# Patient Record
Sex: MALE | Race: OTHER | Employment: FULL TIME | URBAN - METROPOLITAN AREA
[De-identification: names, ages, dates, MRNs, and addresses within clinical notes are randomized per-mention and may not be internally consistent; named-entity substitution may affect disease eponyms.]

---

## 2018-12-05 ENCOUNTER — HOSPITAL ENCOUNTER (EMERGENCY)
Age: 28
Discharge: HOME | End: 2018-12-05
Attending: EMERGENCY MEDICINE

## 2018-12-05 VITALS
RESPIRATION RATE: 16 BRPM | HEART RATE: 78 BPM | OXYGEN SATURATION: 100 % | DIASTOLIC BLOOD PRESSURE: 84 MMHG | SYSTOLIC BLOOD PRESSURE: 135 MMHG | TEMPERATURE: 98 F

## 2018-12-05 DIAGNOSIS — B35.1 ONYCHOMYCOSIS: Primary | ICD-10-CM

## 2018-12-05 DIAGNOSIS — Z71.1 CONCERN ABOUT STD IN MALE WITHOUT DIAGNOSIS: ICD-10-CM

## 2018-12-05 LAB
-: NORMAL
AMORPHOUS: NORMAL
BACTERIA: NORMAL
BILIRUBIN URINE: NEGATIVE
C. TRACHOMATIS DNA ,URINE: NEGATIVE
CASTS UA: NORMAL /LPF (ref 0–8)
COLOR: YELLOW
CRYSTALS, UA: NORMAL /HPF
EPITHELIAL CELLS UA: NORMAL /HPF (ref 0–5)
GLUCOSE URINE: NEGATIVE
KETONES, URINE: NEGATIVE
LEUKOCYTE ESTERASE, URINE: NEGATIVE
MUCUS: NORMAL
N. GONORRHOEAE DNA, URINE: NEGATIVE
NITRITE, URINE: NEGATIVE
OTHER OBSERVATIONS UA: NORMAL
PH UA: 6.5 (ref 5–8)
PROTEIN UA: NEGATIVE
RBC UA: NORMAL /HPF (ref 0–4)
RENAL EPITHELIAL, UA: NORMAL /HPF
SPECIFIC GRAVITY UA: 1.02 (ref 1–1.03)
TRICHOMONAS: NORMAL
TURBIDITY: CLEAR
URINE HGB: NEGATIVE
UROBILINOGEN, URINE: NORMAL
WBC UA: NORMAL /HPF (ref 0–5)
YEAST: NORMAL

## 2018-12-05 PROCEDURE — 87491 CHLMYD TRACH DNA AMP PROBE: CPT

## 2018-12-05 PROCEDURE — 81001 URINALYSIS AUTO W/SCOPE: CPT

## 2018-12-05 PROCEDURE — 99283 EMERGENCY DEPT VISIT LOW MDM: CPT

## 2018-12-05 PROCEDURE — 6360000002 HC RX W HCPCS: Performed by: PHYSICIAN ASSISTANT

## 2018-12-05 PROCEDURE — 6370000000 HC RX 637 (ALT 250 FOR IP): Performed by: PHYSICIAN ASSISTANT

## 2018-12-05 PROCEDURE — 96372 THER/PROPH/DIAG INJ SC/IM: CPT

## 2018-12-05 PROCEDURE — 87591 N.GONORRHOEAE DNA AMP PROB: CPT

## 2018-12-05 RX ORDER — CEFTRIAXONE SODIUM 250 MG/1
250 INJECTION, POWDER, FOR SOLUTION INTRAMUSCULAR; INTRAVENOUS ONCE
Status: COMPLETED | OUTPATIENT
Start: 2018-12-05 | End: 2018-12-05

## 2018-12-05 RX ORDER — AZITHROMYCIN 250 MG/1
1000 TABLET, FILM COATED ORAL ONCE
Status: COMPLETED | OUTPATIENT
Start: 2018-12-05 | End: 2018-12-05

## 2018-12-05 RX ORDER — CLOTRIMAZOLE 1 %
CREAM (GRAM) TOPICAL
Qty: 1 TUBE | Refills: 1 | Status: SHIPPED | OUTPATIENT
Start: 2018-12-05 | End: 2018-12-12

## 2018-12-05 RX ADMIN — AZITHROMYCIN 1000 MG: 250 TABLET, FILM COATED ORAL at 08:22

## 2018-12-05 RX ADMIN — CEFTRIAXONE SODIUM 250 MG: 1 INJECTION, POWDER, FOR SOLUTION INTRAMUSCULAR; INTRAVENOUS at 08:23

## 2018-12-05 ASSESSMENT — ENCOUNTER SYMPTOMS
ABDOMINAL PAIN: 0
SORE THROAT: 0
DIARRHEA: 0
BACK PAIN: 0
SHORTNESS OF BREATH: 0
VOMITING: 0
COLOR CHANGE: 0
COUGH: 0
NAUSEA: 0

## 2018-12-10 ENCOUNTER — OFFICE VISIT (OUTPATIENT)
Dept: PRIMARY CARE CLINIC | Age: 28
End: 2018-12-10

## 2018-12-10 VITALS
TEMPERATURE: 97.7 F | WEIGHT: 146.6 LBS | BODY MASS INDEX: 25.98 KG/M2 | SYSTOLIC BLOOD PRESSURE: 134 MMHG | HEART RATE: 61 BPM | HEIGHT: 63 IN | DIASTOLIC BLOOD PRESSURE: 82 MMHG | OXYGEN SATURATION: 98 %

## 2018-12-10 DIAGNOSIS — B85.3: Primary | ICD-10-CM

## 2018-12-10 PROCEDURE — 99202 OFFICE O/P NEW SF 15 MIN: CPT | Performed by: INTERNAL MEDICINE

## 2018-12-10 ASSESSMENT — PATIENT HEALTH QUESTIONNAIRE - PHQ9
SUM OF ALL RESPONSES TO PHQ QUESTIONS 1-9: 0
1. LITTLE INTEREST OR PLEASURE IN DOING THINGS: 0
SUM OF ALL RESPONSES TO PHQ9 QUESTIONS 1 & 2: 0
SUM OF ALL RESPONSES TO PHQ QUESTIONS 1-9: 0
2. FEELING DOWN, DEPRESSED OR HOPELESS: 0

## 2018-12-10 NOTE — PROGRESS NOTES
[facility]an [facility]  [place] [address]  Dept: [phone]  Dept Fax: [phone]    Saba Gale is a 34 y.o. male who presents today for   Chief Complaint   Patient presents with   [facility] Other     concerned about STD with burning and itching    and follow upof chronic medical problems: There is no problem list on file for this patient. .    No past medical history on file. No past surgical history on file. No family history on file. Social History   Substance Use Topics    Smoking status: Never Smoker    Smokeless tobacco: Never Used    Alcohol use Yes      Current Outpatient Prescriptions   Medication Sig Dispense Refill    pyrethrins-piperonyl butoxide (RID LICE KILLING SHAMPOO) 0.33-4 % shampoo Apply topically once. Repeat in one week. 1 Bottle 1    clotrimazole (LOTRIMIN) 1 % cream Apply topically 3 times daily to lesions on skin. 1 Tube 1     No current facility-administered medications for this visit. No Known Allergies    Health Maintenance   Topic Date Due    HIV screen  09/23/2007    DTaP/Tdap/Td vaccine (1 - Tdap) 09/23/2011    Flu vaccine (1) 12/31/2018 (Originally 9/1/2018)       Controlled Substances Monitoring:      HPI:     Sexually Transmitted Diseases   The patient's primary symptoms include genital itching. The patient's pertinent negatives include no penile discharge. This is a new problem. The current episode started in the past 7 days. The problem occurs constantly. The problem has been unchanged. The patient is experiencing no pain. Nothing aggravates the symptoms. He has tried antibiotics (seen in the ED treated for STD with IM medication now with genital rash and itching) for the symptoms. The treatment provided mild relief. He is sexually active. It is unknown whether or not his partner has an STD. ROS:     Review of Systems   Genitourinary: Negative for discharge.    All other systems

## 2018-12-18 ENCOUNTER — OFFICE VISIT (OUTPATIENT)
Dept: FAMILY MEDICINE CLINIC | Age: 28
End: 2018-12-18

## 2018-12-18 VITALS
WEIGHT: 132.2 LBS | HEART RATE: 70 BPM | BODY MASS INDEX: 23.42 KG/M2 | HEIGHT: 63 IN | TEMPERATURE: 99.4 F | SYSTOLIC BLOOD PRESSURE: 129 MMHG | DIASTOLIC BLOOD PRESSURE: 78 MMHG

## 2018-12-18 DIAGNOSIS — R21 RASH OF GENITAL AREA: Primary | ICD-10-CM

## 2018-12-18 PROCEDURE — 99203 OFFICE O/P NEW LOW 30 MIN: CPT | Performed by: STUDENT IN AN ORGANIZED HEALTH CARE EDUCATION/TRAINING PROGRAM

## 2018-12-18 PROCEDURE — 99201 HC NEW PT, E/M LEVEL 1: CPT | Performed by: STUDENT IN AN ORGANIZED HEALTH CARE EDUCATION/TRAINING PROGRAM

## 2018-12-18 RX ORDER — NYSTATIN 100000 [USP'U]/G
POWDER TOPICAL
Qty: 1 BOTTLE | Refills: 1 | Status: SHIPPED | OUTPATIENT
Start: 2018-12-18 | End: 2019-01-09 | Stop reason: SDUPTHER

## 2018-12-18 RX ORDER — IVERMECTIN 3 MG/1
200 TABLET ORAL
Qty: 4 TABLET | Refills: 1 | Status: SHIPPED | OUTPATIENT
Start: 2018-12-18 | End: 2019-01-01

## 2018-12-18 ASSESSMENT — ENCOUNTER SYMPTOMS
NAUSEA: 0
CONSTIPATION: 0
DIARRHEA: 0
VOMITING: 0
ABDOMINAL PAIN: 0
COLOR CHANGE: 0

## 2018-12-18 NOTE — PROGRESS NOTES
Subjective:    Saba Gale is a 34 y.o. male with  has no past medical history on file. No family history on file. Presented to the office today for:  Chief Complaint   Patient presents with    Follow-Up from Hospital     here for hospital follow up rash on anus and spreading    Establish Care     here to establish care       Mr. Pam Contreras a 33 yo  male presents to office for itching and burning of the ano-genital region. Has no reported medical history. Mr. Pam Contreras brought an  that he preferred to use over ours. Ano-genital Rash:  Present for just over 1 week, was seen at a walk-in clinic 12/10 and given Permethrin shampoo, used this once and now has itching and burning  It gets worse at work where he states he gets sweaty   Was seen in the ED 2 weeks ago for possible STI, given prophylactic ceftriaxone, NAAT negative and U&A WNL  Denies fever, chills, or urinary complaints   Describes the discomfort as intensely itchy and burning   Last sexual activity 1 week ago  Shaves regularly in the genital region; no change in lotion, shampoo, or creams for hygiene     Had fungal infection of finger nail on MR, no current complaints   No other complaints noted   Briefly discussed healthy diet  Will discusses vaccination and possible diabetes testing at next visit           Review of Systems   Constitutional: Negative for activity change, appetite change, chills, fatigue and fever. Gastrointestinal: Negative for abdominal pain, constipation, diarrhea, nausea and vomiting. Genitourinary: Negative for difficulty urinating, discharge, flank pain, frequency, genital sores, penile pain, testicular pain and urgency. Skin: Positive for rash. Negative for color change, pallor and wound. Allergic/Immunologic: Negative for immunocompromised state.        Objective:    /78 (Site: Left Upper Arm, Position: Sitting, Cuff Size: Medium Adult) Comment: machine  Pulse 70   Temp 99.4 °F (37.4 °C)

## 2018-12-19 NOTE — PROGRESS NOTES
I have reviewed and discussed pittman elements of Christopher Virgen with the resident including plan of care and follow up and agree with the care asif plan.

## 2018-12-21 ENCOUNTER — HOSPITAL ENCOUNTER (OUTPATIENT)
Age: 28
Setting detail: SPECIMEN
Discharge: HOME | End: 2018-12-21

## 2018-12-21 ENCOUNTER — OFFICE VISIT (OUTPATIENT)
Dept: FAMILY MEDICINE CLINIC | Age: 28
End: 2018-12-21

## 2018-12-21 VITALS
DIASTOLIC BLOOD PRESSURE: 78 MMHG | TEMPERATURE: 98.2 F | HEIGHT: 63 IN | BODY MASS INDEX: 23.39 KG/M2 | WEIGHT: 132 LBS | SYSTOLIC BLOOD PRESSURE: 133 MMHG | HEART RATE: 71 BPM

## 2018-12-21 DIAGNOSIS — N45.1 EPIDIDYMITIS: ICD-10-CM

## 2018-12-21 DIAGNOSIS — N45.1 EPIDIDYMITIS: Primary | ICD-10-CM

## 2018-12-21 LAB
BILIRUBIN URINE: NEGATIVE
BILIRUBIN, POC: NEGATIVE
BLOOD URINE, POC: NEGATIVE
CLARITY, POC: CLEAR
COLOR, POC: YELLOW
COLOR: YELLOW
COMMENT UA: NORMAL
GLUCOSE URINE, POC: NEGATIVE
GLUCOSE URINE: NEGATIVE
KETONES, POC: NEGATIVE
KETONES, URINE: NEGATIVE
LEUKOCYTE EST, POC: NEGATIVE
LEUKOCYTE ESTERASE, URINE: NEGATIVE
NITRITE, POC: NEGATIVE
NITRITE, URINE: NEGATIVE
PH UA: 6.5 (ref 5–8)
PH, POC: 6.5
PROTEIN UA: NEGATIVE
PROTEIN, POC: 30
SPECIFIC GRAVITY UA: 1.03 (ref 1–1.03)
SPECIFIC GRAVITY, POC: 1.02
TURBIDITY: CLEAR
URINE HGB: NEGATIVE
UROBILINOGEN, POC: NEGATIVE
UROBILINOGEN, URINE: NORMAL

## 2018-12-21 PROCEDURE — 99211 OFF/OP EST MAY X REQ PHY/QHP: CPT | Performed by: STUDENT IN AN ORGANIZED HEALTH CARE EDUCATION/TRAINING PROGRAM

## 2018-12-21 PROCEDURE — 81003 URINALYSIS AUTO W/O SCOPE: CPT | Performed by: STUDENT IN AN ORGANIZED HEALTH CARE EDUCATION/TRAINING PROGRAM

## 2018-12-21 PROCEDURE — 99213 OFFICE O/P EST LOW 20 MIN: CPT | Performed by: STUDENT IN AN ORGANIZED HEALTH CARE EDUCATION/TRAINING PROGRAM

## 2018-12-21 RX ORDER — DOXYCYCLINE HYCLATE 100 MG/1
100 CAPSULE ORAL 2 TIMES DAILY
Qty: 14 CAPSULE | Refills: 0 | Status: SHIPPED | OUTPATIENT
Start: 2018-12-21 | End: 2018-12-28

## 2018-12-21 NOTE — PROGRESS NOTES
well-developed and well-nourished. No distress. Genitourinary: Testes normal and penis normal. Right testis shows no swelling. Left testis shows no swelling. Uncircumcised. No phimosis, paraphimosis, hypospadias, penile erythema or penile tenderness. No discharge found. Musculoskeletal: Normal range of motion. Neurological: He is alert and oriented to person, place, and time. Skin: Skin is warm and dry. He is not diaphoretic. No results found for: WBC, HGB, HCT, PLT, CHOL, TRIG, HDL, LDLDIRECT, ALT, AST, NA, K, CL, CREATININE, BUN, CO2, TSH, PSA, INR, GLUF, LABA1C, LABMICR  No results found for: CALCIUM, PHOS  No results found for: LDLCALC, LDLCHOLESTEROL, LDLDIRECT    Assessment and Plan:    1. Epididymitis  - Chlamydia/GC DNA, Urine  - POCT Urinalysis with reflex to culture  - doxycycline hyclate (VIBRAMYCIN) 100 MG capsule; Take 1 capsule by mouth 2 times daily for 7 days  Dispense: 14 capsule; Refill: 0  -Patient instructed to discontinue Ivermectin  -Patient to follow-up in clinic if symptoms worsen or fail to improve.  -Patient educated on safe sex practices. Requested Prescriptions     Signed Prescriptions Disp Refills    doxycycline hyclate (VIBRAMYCIN) 100 MG capsule 14 capsule 0     Sig: Take 1 capsule by mouth 2 times daily for 7 days       There are no discontinued medications. Enoe received counseling on the following healthy behaviors:nutrition, exercise and medication adherence    Discussed use, benefit, and side effects of prescribed medications. Barriers to medication compliance addressed. All patient questions answered. Pt voiced understanding. Return if symptoms worsen or fail to improve.

## 2018-12-24 LAB
C. TRACHOMATIS DNA ,URINE: NEGATIVE
N. GONORRHOEAE DNA, URINE: NEGATIVE

## 2019-01-08 ENCOUNTER — OFFICE VISIT (OUTPATIENT)
Dept: FAMILY MEDICINE CLINIC | Age: 29
End: 2019-01-08

## 2019-01-08 VITALS
TEMPERATURE: 97.8 F | WEIGHT: 132.6 LBS | HEIGHT: 63 IN | SYSTOLIC BLOOD PRESSURE: 137 MMHG | BODY MASS INDEX: 23.5 KG/M2 | HEART RATE: 82 BPM | DIASTOLIC BLOOD PRESSURE: 80 MMHG

## 2019-01-08 DIAGNOSIS — N48.89 PENILE PAIN: Primary | ICD-10-CM

## 2019-01-08 DIAGNOSIS — L29.0 ANAL PRURITUS: ICD-10-CM

## 2019-01-08 PROCEDURE — 99213 OFFICE O/P EST LOW 20 MIN: CPT | Performed by: STUDENT IN AN ORGANIZED HEALTH CARE EDUCATION/TRAINING PROGRAM

## 2019-01-08 RX ORDER — DIAPER,BRIEF,INFANT-TODD,DISP
EACH MISCELLANEOUS
Qty: 1 TUBE | Refills: 1 | Status: SHIPPED | OUTPATIENT
Start: 2019-01-08 | End: 2019-01-15

## 2019-01-08 ASSESSMENT — ENCOUNTER SYMPTOMS
ANAL BLEEDING: 0
COUGH: 0
VOMITING: 0
DIARRHEA: 0
SHORTNESS OF BREATH: 0
CONSTIPATION: 0
RECTAL PAIN: 0
ABDOMINAL DISTENTION: 0
NAUSEA: 0
ABDOMINAL PAIN: 0

## 2019-01-09 ENCOUNTER — TELEPHONE (OUTPATIENT)
Dept: FAMILY MEDICINE CLINIC | Age: 29
End: 2019-01-09

## 2019-01-09 DIAGNOSIS — L29.0 ANAL PRURITUS: Primary | ICD-10-CM

## 2019-01-09 RX ORDER — NYSTATIN 100000 [USP'U]/G
POWDER TOPICAL
Qty: 1 BOTTLE | Refills: 1 | Status: SHIPPED | OUTPATIENT
Start: 2019-01-09 | End: 2019-03-01 | Stop reason: ALTCHOICE

## 2019-01-25 ENCOUNTER — OFFICE VISIT (OUTPATIENT)
Dept: UROLOGY | Age: 29
End: 2019-01-25

## 2019-01-25 VITALS
HEIGHT: 61 IN | WEIGHT: 134.4 LBS | BODY MASS INDEX: 25.37 KG/M2 | SYSTOLIC BLOOD PRESSURE: 127 MMHG | HEART RATE: 71 BPM | DIASTOLIC BLOOD PRESSURE: 81 MMHG

## 2019-01-25 DIAGNOSIS — N48.1 BALANITIS: ICD-10-CM

## 2019-01-25 DIAGNOSIS — N48.89 PENILE PAIN: Primary | ICD-10-CM

## 2019-01-25 LAB
APPEARANCE FLUID: ABNORMAL
BILIRUBIN, POC: ABNORMAL
BLOOD URINE, POC: ABNORMAL
CLARITY, POC: CLEAR
COLOR, POC: YELLOW
GLUCOSE URINE, POC: ABNORMAL
KETONES, POC: ABNORMAL
LEUKOCYTE EST, POC: ABNORMAL
NITRITE, POC: ABNORMAL
PH, POC: 7
PROTEIN, POC: ABNORMAL
SPECIFIC GRAVITY, POC: 1.02
UROBILINOGEN, POC: 0.2

## 2019-01-25 PROCEDURE — 99204 OFFICE O/P NEW MOD 45 MIN: CPT | Performed by: UROLOGY

## 2019-01-25 PROCEDURE — 81002 URINALYSIS NONAUTO W/O SCOPE: CPT | Performed by: UROLOGY

## 2019-01-25 RX ORDER — CLOTRIMAZOLE 1 %
CREAM (GRAM) TOPICAL
Qty: 1 TUBE | Refills: 0 | Status: SHIPPED | OUTPATIENT
Start: 2019-01-25 | End: 2019-02-01

## 2019-01-25 RX ADMIN — Medication: at 10:35

## 2019-02-11 ENCOUNTER — OFFICE VISIT (OUTPATIENT)
Dept: FAMILY MEDICINE CLINIC | Age: 29
End: 2019-02-11

## 2019-02-11 VITALS
HEIGHT: 61 IN | DIASTOLIC BLOOD PRESSURE: 85 MMHG | BODY MASS INDEX: 25.22 KG/M2 | WEIGHT: 133.6 LBS | TEMPERATURE: 98.3 F | HEART RATE: 68 BPM | SYSTOLIC BLOOD PRESSURE: 136 MMHG

## 2019-02-11 DIAGNOSIS — L29.0 ANAL PRURITUS: ICD-10-CM

## 2019-02-11 DIAGNOSIS — K21.9 GASTROESOPHAGEAL REFLUX DISEASE, ESOPHAGITIS PRESENCE NOT SPECIFIED: Primary | ICD-10-CM

## 2019-02-11 PROCEDURE — 99213 OFFICE O/P EST LOW 20 MIN: CPT | Performed by: STUDENT IN AN ORGANIZED HEALTH CARE EDUCATION/TRAINING PROGRAM

## 2019-02-11 PROCEDURE — 99211 OFF/OP EST MAY X REQ PHY/QHP: CPT | Performed by: STUDENT IN AN ORGANIZED HEALTH CARE EDUCATION/TRAINING PROGRAM

## 2019-02-11 RX ORDER — RANITIDINE 150 MG/1
150 TABLET ORAL 2 TIMES DAILY
Qty: 60 TABLET | Refills: 0 | Status: SHIPPED | OUTPATIENT
Start: 2019-02-11 | End: 2021-02-22

## 2019-02-11 ASSESSMENT — ENCOUNTER SYMPTOMS
DIARRHEA: 0
ANAL BLEEDING: 0
NAUSEA: 1
RHINORRHEA: 0
CONSTIPATION: 0
COLOR CHANGE: 0
SHORTNESS OF BREATH: 0
CHOKING: 0
ABDOMINAL PAIN: 0
WHEEZING: 0

## 2019-02-11 ASSESSMENT — PATIENT HEALTH QUESTIONNAIRE - PHQ9
2. FEELING DOWN, DEPRESSED OR HOPELESS: 1
SUM OF ALL RESPONSES TO PHQ9 QUESTIONS 1 & 2: 1
1. LITTLE INTEREST OR PLEASURE IN DOING THINGS: 0
SUM OF ALL RESPONSES TO PHQ QUESTIONS 1-9: 1
SUM OF ALL RESPONSES TO PHQ QUESTIONS 1-9: 1

## 2019-02-21 ENCOUNTER — HOSPITAL ENCOUNTER (OUTPATIENT)
Age: 29
Setting detail: SPECIMEN
Discharge: HOME | End: 2019-02-21

## 2019-02-21 ENCOUNTER — OFFICE VISIT (OUTPATIENT)
Dept: FAMILY MEDICINE CLINIC | Age: 29
End: 2019-02-21

## 2019-02-21 ENCOUNTER — HOSPITAL ENCOUNTER (OUTPATIENT)
Dept: ULTRASOUND IMAGING | Age: 29
Discharge: HOME | End: 2019-02-23

## 2019-02-21 ENCOUNTER — TELEPHONE (OUTPATIENT)
Dept: FAMILY MEDICINE CLINIC | Age: 29
End: 2019-02-21

## 2019-02-21 VITALS
HEIGHT: 60 IN | SYSTOLIC BLOOD PRESSURE: 118 MMHG | HEART RATE: 81 BPM | TEMPERATURE: 98 F | WEIGHT: 133.8 LBS | BODY MASS INDEX: 26.27 KG/M2 | DIASTOLIC BLOOD PRESSURE: 78 MMHG

## 2019-02-21 DIAGNOSIS — N50.811 TESTICULAR PAIN, RIGHT: Primary | ICD-10-CM

## 2019-02-21 DIAGNOSIS — R52 PAIN: ICD-10-CM

## 2019-02-21 DIAGNOSIS — N50.811 TESTICULAR PAIN, RIGHT: ICD-10-CM

## 2019-02-21 PROCEDURE — 93976 VASCULAR STUDY: CPT

## 2019-02-21 PROCEDURE — 99213 OFFICE O/P EST LOW 20 MIN: CPT | Performed by: STUDENT IN AN ORGANIZED HEALTH CARE EDUCATION/TRAINING PROGRAM

## 2019-02-21 PROCEDURE — 96372 THER/PROPH/DIAG INJ SC/IM: CPT

## 2019-02-21 PROCEDURE — 76870 US EXAM SCROTUM: CPT

## 2019-02-21 PROCEDURE — 99211 OFF/OP EST MAY X REQ PHY/QHP: CPT | Performed by: FAMILY MEDICINE

## 2019-02-21 PROCEDURE — 6360000002 HC RX W HCPCS

## 2019-02-21 RX ORDER — CEFTRIAXONE SODIUM 250 MG/1
250 INJECTION, POWDER, FOR SOLUTION INTRAMUSCULAR; INTRAVENOUS ONCE
Status: COMPLETED | OUTPATIENT
Start: 2019-02-21 | End: 2019-02-21

## 2019-02-21 RX ORDER — AZITHROMYCIN 1 G
1 PACKET (EA) ORAL ONCE
Qty: 1 PACKAGE | Refills: 0 | Status: SHIPPED | OUTPATIENT
Start: 2019-02-21 | End: 2019-02-21

## 2019-02-21 RX ADMIN — CEFTRIAXONE SODIUM 250 MG: 250 INJECTION, POWDER, FOR SOLUTION INTRAMUSCULAR; INTRAVENOUS at 09:58

## 2019-02-21 ASSESSMENT — ENCOUNTER SYMPTOMS
NAUSEA: 0
VOMITING: 0

## 2019-02-22 LAB
C. TRACHOMATIS DNA ,URINE: NEGATIVE
N. GONORRHOEAE DNA, URINE: NEGATIVE
SPECIMEN DESCRIPTION: NORMAL

## 2019-02-23 ENCOUNTER — TELEPHONE (OUTPATIENT)
Dept: FAMILY MEDICINE CLINIC | Age: 29
End: 2019-02-23

## 2019-02-23 DIAGNOSIS — N39.0 URINARY TRACT INFECTION WITHOUT HEMATURIA, SITE UNSPECIFIED: Primary | ICD-10-CM

## 2019-02-23 LAB
CULTURE: ABNORMAL
Lab: ABNORMAL
SPECIMEN DESCRIPTION: ABNORMAL

## 2019-02-23 RX ORDER — CIPROFLOXACIN 500 MG/1
500 TABLET, FILM COATED ORAL 2 TIMES DAILY
Qty: 14 TABLET | Refills: 0 | Status: SHIPPED | OUTPATIENT
Start: 2019-02-23 | End: 2019-03-01 | Stop reason: ALTCHOICE

## 2019-03-01 ENCOUNTER — OFFICE VISIT (OUTPATIENT)
Dept: UROLOGY | Age: 29
End: 2019-03-01

## 2019-03-01 VITALS
HEIGHT: 60 IN | HEART RATE: 80 BPM | BODY MASS INDEX: 26.35 KG/M2 | RESPIRATION RATE: 16 BRPM | SYSTOLIC BLOOD PRESSURE: 134 MMHG | WEIGHT: 134.2 LBS | DIASTOLIC BLOOD PRESSURE: 91 MMHG

## 2019-03-01 DIAGNOSIS — N45.1 EPIDIDYMITIS: Primary | ICD-10-CM

## 2019-03-01 PROCEDURE — 99213 OFFICE O/P EST LOW 20 MIN: CPT | Performed by: UROLOGY

## 2019-03-01 RX ORDER — DOXYCYCLINE HYCLATE 100 MG
100 TABLET ORAL 2 TIMES DAILY
Qty: 14 TABLET | Refills: 0 | Status: SHIPPED | OUTPATIENT
Start: 2019-03-01 | End: 2019-03-08

## 2020-09-09 ENCOUNTER — NURSE TRIAGE (OUTPATIENT)
Dept: OTHER | Facility: CLINIC | Age: 30
End: 2020-09-09

## 2020-09-09 NOTE — TELEPHONE ENCOUNTER
Received call from Zac Membreno in [facility]. Xiomara Marr, is patient's  and patient is on the line. He is having burning with urination about a month ago. Pain is 5-6/10. Denies known covid 19 exposure. Call soft transferred to [facility] in [address] to schedule appointment. Please do not reply to the triage nurse through this encounter. Any subsequent communication should be directly with the patient.      Reason for Disposition   All other males with painful urination, or patient wants to be seen    Protocols used: URINATION PAIN - MALE-ADULT-OH

## 2020-09-11 ENCOUNTER — TELEPHONE (OUTPATIENT)
Dept: FAMILY MEDICINE CLINIC | Age: 30
End: 2020-09-11

## 2020-09-21 ENCOUNTER — OFFICE VISIT (OUTPATIENT)
Dept: FAMILY MEDICINE CLINIC | Age: 30
End: 2020-09-21

## 2020-09-21 ENCOUNTER — HOSPITAL ENCOUNTER (OUTPATIENT)
Age: 30
Setting detail: SPECIMEN
Discharge: HOME | End: 2020-09-21

## 2020-09-21 VITALS
SYSTOLIC BLOOD PRESSURE: 126 MMHG | WEIGHT: 141 LBS | TEMPERATURE: 96.6 F | DIASTOLIC BLOOD PRESSURE: 78 MMHG | BODY MASS INDEX: 27.68 KG/M2 | HEART RATE: 61 BPM | HEIGHT: 60 IN

## 2020-09-21 LAB
BILIRUBIN URINE: NEGATIVE
COLOR: YELLOW
COMMENT UA: NORMAL
GLUCOSE URINE: NEGATIVE
KETONES, URINE: NEGATIVE
LEUKOCYTE ESTERASE, URINE: NEGATIVE
NITRITE, URINE: NEGATIVE
PH UA: 7 (ref 5–8)
PROTEIN UA: NEGATIVE
SPECIFIC GRAVITY UA: 1.01 (ref 1–1.03)
TURBIDITY: CLEAR
URINE HGB: NEGATIVE
UROBILINOGEN, URINE: NORMAL

## 2020-09-21 PROCEDURE — 99213 OFFICE O/P EST LOW 20 MIN: CPT | Performed by: STUDENT IN AN ORGANIZED HEALTH CARE EDUCATION/TRAINING PROGRAM

## 2020-09-21 PROCEDURE — 99211 OFF/OP EST MAY X REQ PHY/QHP: CPT | Performed by: STUDENT IN AN ORGANIZED HEALTH CARE EDUCATION/TRAINING PROGRAM

## 2020-09-21 ASSESSMENT — ENCOUNTER SYMPTOMS
SHORTNESS OF BREATH: 0
CHEST TIGHTNESS: 0
CONSTIPATION: 0
VOMITING: 0
DIARRHEA: 0
NAUSEA: 0
ABDOMINAL PAIN: 0

## 2020-09-21 ASSESSMENT — PATIENT HEALTH QUESTIONNAIRE - PHQ9
1. LITTLE INTEREST OR PLEASURE IN DOING THINGS: 3
SUM OF ALL RESPONSES TO PHQ9 QUESTIONS 1 & 2: 6
2. FEELING DOWN, DEPRESSED OR HOPELESS: 3
SUM OF ALL RESPONSES TO PHQ QUESTIONS 1-9: 6
SUM OF ALL RESPONSES TO PHQ QUESTIONS 1-9: 6
DEPRESSION UNABLE TO ASSESS: FUNCTIONAL CAPACITY MOTIVATION LIMITS ACCURACY

## 2020-09-21 NOTE — PATIENT INSTRUCTIONS
Thank you for letting us take care of you today. We hope all your questions were addressed. If a question was overlooked or something else comes to mind after you return home, please contact a member of your Care Team listed below. Your Care Team at [place] [address] is Team #5  Abimael Broderick MD (Faculty)  Kenyatta Cabrera MD (Resident)  Shan Solis MD (Resident)  Oscar Muñiz MD (Resident)  FABIANO Solis. ,JENI SETHI, JENI Garces ([address] office)  [facility] office)  Hadley Mercado, [address] (Clinical Practice Manager)  Nolan Church [facility] (Clinical Pharmacist)       Office phone number: [phone]    If you need to get in right away due to illness, please be advised we have \"Same Day\" appointments available Monday-Friday. Please call us at [phone] option #3 to schedule your \"Same Day\" appointment.

## 2020-09-21 NOTE — PROGRESS NOTES
Subjective:    Melissa Arango is a 34 y.o. male with  has no past medical history on file. Presented to the office today for:  Chief Complaint   Patient presents with    Urinary Tract Infection     abd pain, burns, after unrination still urinated     Constipation     abd pain, making sounds     Health Maintenance     declined tdap vaccine/positive PHQ-9       HPI    34year old [address] speaking male presented with complaint of burning on urination. History obtained through phone . Patient stated that he has been having burning on urination for the past 1 month. Patient also reported incomplete emptying of the bladder. Patient denied any fever, chills, nausea, vomiting. Patient denied any urethral discharge. Patient also complained of mild generalized abdominal pain that self-resolves. Patient was seen by urologist in the past and treated by epididymitis because of symptoms of testicular pain. Has not seen urologist since. Denied any testicular pain today. No other acute concerns at this time. Review of Systems   Constitutional: Negative for chills and fever. Eyes: Negative for visual disturbance. Respiratory: Negative for chest tightness and shortness of breath. Cardiovascular: Negative for chest pain and leg swelling. Gastrointestinal: Negative for abdominal pain, constipation, diarrhea, nausea and vomiting. Genitourinary: Positive for dysuria. Negative for discharge, penile pain and testicular pain. Neurological: Negative for headaches. The patient has a No family history on file. Objective:    /78 (Site: Left Upper Arm, Position: Sitting, Cuff Size: Medium Adult) Comment: machine  Pulse 61   Temp 96.6 °F (35.9 °C) (Temporal)   Ht 4' 11\" (1.499 m)   Wt 141 lb (64 kg)   BMI 28.48 kg/m²    BP Readings from Last 3 Encounters:   09/21/20 126/78   03/01/19 (!) 134/91   02/21/19 118/78       Physical Exam  Vitals signs reviewed.    Constitutional:       Appearance: He is well-developed. HENT:      Head: Normocephalic and atraumatic. Eyes:      Pupils: Pupils are equal, round, and reactive to light. Cardiovascular:      Rate and Rhythm: Normal rate and regular rhythm. Heart sounds: Normal heart sounds. Pulmonary:      Effort: Pulmonary effort is normal. No respiratory distress. Breath sounds: Normal breath sounds. No wheezing or rales. Abdominal:      General: Bowel sounds are normal.      Palpations: Abdomen is soft. Tenderness: There is no abdominal tenderness. Skin:     General: Skin is warm and dry. Neurological:      Mental Status: He is alert and oriented to person, place, and time. No results found for: WBC, HGB, HCT, PLT, CHOL, TRIG, HDL, LDLDIRECT, ALT, AST, NA, K, CL, CREATININE, BUN, CO2, TSH, PSA, INR, GLUF, LABA1C, LABMICR  No results found for: CALCIUM, PHOS  No results found for: LDLCALC, LDLCHOLESTEROL, LDLDIRECT    Assessment and Plan:    1. Burning with urination  - will follow up in 1 week, will consider US if symptoms still persist  - Urinalysis; Future  - Culture, Urine; Future  - Chlamydia/GC DNA, Urine; Future    Requested Prescriptions      No prescriptions requested or ordered in this encounter       There are no discontinued medications. Enoe received counseling on the following healthy behaviors: nutrition, exercise and medication adherence    Discussed use,benefit, and side effects of prescribed medications. Barriers to medication compliance addressed. All patient questions answered. Pt voiced understanding. Return in about 1 week (around 9/28/2020). Disclaimer: Some orall of this note was transcribed using voice-recognition software. This may cause typographical errors occasionally. Although all effort is made to fix these errors, please do not hesitate to contact our office if there Jesús Abbott concern with the understanding of this note.

## 2020-09-22 LAB
CULTURE: NORMAL
Lab: NORMAL
SPECIMEN DESCRIPTION: NORMAL

## 2021-02-02 ENCOUNTER — TELEPHONE (OUTPATIENT)
Dept: FAMILY MEDICINE CLINIC | Age: 31
End: 2021-02-02

## 2021-02-02 NOTE — TELEPHONE ENCOUNTER
Labs were normal. If he is still concerned, is having symptoms or wants to talk.  Can we schedule an office visit please

## 2021-02-02 NOTE — TELEPHONE ENCOUNTER
* service used**  Pt called to say that he had labs completed 9/21/2020 and no one ever called him with the results. He was told someone would call in 3 days after the labs were completed and on one has yet to follow up. He would like a call as soon as possible to go over lab results.    Pt needs     PT can be reached at [phone]

## 2021-02-12 ENCOUNTER — TELEPHONE (OUTPATIENT)
Dept: FAMILY MEDICINE CLINIC | Age: 31
End: 2021-02-12

## 2021-02-22 ENCOUNTER — HOSPITAL ENCOUNTER (OUTPATIENT)
Age: 31
Setting detail: SPECIMEN
Discharge: HOME | End: 2021-02-22

## 2021-02-22 ENCOUNTER — OFFICE VISIT (OUTPATIENT)
Dept: FAMILY MEDICINE CLINIC | Age: 31
End: 2021-02-22

## 2021-02-22 VITALS
DIASTOLIC BLOOD PRESSURE: 89 MMHG | HEART RATE: 75 BPM | SYSTOLIC BLOOD PRESSURE: 143 MMHG | BODY MASS INDEX: 30.09 KG/M2 | WEIGHT: 149 LBS

## 2021-02-22 DIAGNOSIS — Z11.4 ENCOUNTER FOR SCREENING FOR HIV: ICD-10-CM

## 2021-02-22 DIAGNOSIS — R30.0 BURNING WITH URINATION: Primary | ICD-10-CM

## 2021-02-22 DIAGNOSIS — B35.6 TINEA CRURIS: ICD-10-CM

## 2021-02-22 DIAGNOSIS — R30.0 BURNING WITH URINATION: ICD-10-CM

## 2021-02-22 DIAGNOSIS — Z11.59 ENCOUNTER FOR HEPATITIS C SCREENING TEST FOR LOW RISK PATIENT: ICD-10-CM

## 2021-02-22 LAB
BILIRUBIN, POC: NORMAL
BLOOD URINE, POC: NORMAL
CLARITY, POC: NORMAL
COLOR, POC: YELLOW
GLUCOSE URINE, POC: NORMAL
KETONES, POC: NORMAL
LEUKOCYTE EST, POC: NORMAL
NITRITE, POC: NORMAL
PH, POC: 7
PROTEIN, POC: NORMAL
SPECIFIC GRAVITY, POC: 1.02
UROBILINOGEN, POC: 0.2

## 2021-02-22 PROCEDURE — 99213 OFFICE O/P EST LOW 20 MIN: CPT | Performed by: STUDENT IN AN ORGANIZED HEALTH CARE EDUCATION/TRAINING PROGRAM

## 2021-02-22 PROCEDURE — 81002 URINALYSIS NONAUTO W/O SCOPE: CPT | Performed by: STUDENT IN AN ORGANIZED HEALTH CARE EDUCATION/TRAINING PROGRAM

## 2021-02-22 RX ORDER — CLOTRIMAZOLE 1 %
CREAM (GRAM) TOPICAL 2 TIMES DAILY
COMMUNITY
End: 2021-02-22 | Stop reason: SDUPTHER

## 2021-02-22 RX ORDER — CLOTRIMAZOLE 1 %
CREAM (GRAM) TOPICAL
Qty: 1 TUBE | Refills: 0 | Status: SHIPPED | OUTPATIENT
Start: 2021-02-22 | End: 2021-05-17 | Stop reason: SDUPTHER

## 2021-02-22 ASSESSMENT — ENCOUNTER SYMPTOMS
COLOR CHANGE: 1
SHORTNESS OF BREATH: 0
ABDOMINAL PAIN: 0
DIARRHEA: 0
WHEEZING: 0
CONSTIPATION: 0

## 2021-02-22 NOTE — PROGRESS NOTES
I have reviewed and discussed pittman elements of Christopher Jennings with the resident including plan of care and follow up and agree with the care asif plan. Due to possibility of false negative chlamydia testing, consider empiric treatment if symptoms don't resolve.
Visit Information    Have you changed or started any medications since your last visit including any over-the-counter medicines, vitamins, or herbal medicines? no   Have you stopped taking any of your medications? Is so, why? -  no  Are you having any side effects from any of your medications? - no    Have you seen any other physician or provider since your last visit?  no   Have you had any other diagnostic tests since your last visit?  no   Have you been seen in the emergency room and/or had an admission in a hospital since we last saw you?  no   Have you had your routine dental cleaning in the past 6 months?  no     Do you have an active MyChart account? If no, what is the barrier?   No    Patient Care Team:  Lauren Neal MD as PCP - General (Family Medicine)    Medical History Review  Past Medical, Family, and Social History reviewed and does contribute to the patient presenting condition    Health Maintenance   Topic Date Due    Hepatitis C screen  1992    Varicella vaccine (1 of 2 - 2-dose childhood series) 09/23/1993    HIV screen  09/23/2007    DTaP/Tdap/Td vaccine (1 - Tdap) 09/23/2011    Flu vaccine (1) 09/01/2020    Hepatitis A vaccine  Aged Out    Hepatitis B vaccine  Aged Out    Hib vaccine  Aged Out    Meningococcal (ACWY) vaccine  Aged Out    Pneumococcal 0-64 years Vaccine  Aged Out
Physical Exam  Vitals signs reviewed. Constitutional:       Appearance: Normal appearance. Cardiovascular:      Rate and Rhythm: Normal rate and regular rhythm. Pulses: Normal pulses. Heart sounds: Normal heart sounds. Pulmonary:      Effort: Pulmonary effort is normal.      Breath sounds: Normal breath sounds. No wheezing. Genitourinary:     Pubic Area: No rash. Penis: Lesions (white discoloration of penile shaft) present. No tenderness or discharge. Skin:     General: Skin is warm. Neurological:      Mental Status: He is alert. No results found for: WBC, HGB, HCT, PLT, CHOL, TRIG, HDL, LDLDIRECT, ALT, AST, NA, K, CL, CREATININE, BUN, CO2, TSH, PSA, INR, GLUF, LABA1C, LABMICR  No results found for: CALCIUM, PHOS  No results found for: LDLCALC, LDLCHOLESTEROL, LDLDIRECT    Assessment and Plan:    1. Burning with urination  UA negative we will send for culture. Patient to follow-up in 2 weeks if remains symptomatic consider urology referral  - POCT Urinalysis no Micro  - Culture, Urine; Future  - Gonorrhea, DNA, Urine; Future  - Chlamydia, DNA, Urine; Future  - CBC With Auto Differential; Future    2. Encounter for hepatitis C screening test for low risk patient  - Hepatitis C Antibody; Future    3. Encounter for screening for HIV  - HIV Screen; Future    4. Tinea cruris  - clotrimazole (LOTRIMIN) 1 % cream; Apply topically 2 times daily. Dispense: 1 Tube; Refill: 0          Requested Prescriptions     Signed Prescriptions Disp Refills    clotrimazole (LOTRIMIN) 1 % cream 1 Tube 0     Sig: Apply topically 2 times daily.        Medications Discontinued During This Encounter   Medication Reason    clotrimazole (LOTRIMIN) 1 % cream REORDER    ranitidine (ZANTAC) 150 MG tablet LIST CLEANUP       Enoe received counseling on the following healthy behaviors: nutrition, exercise and medication adherence

## 2021-02-23 LAB
C. TRACHOMATIS DNA ,URINE: NEGATIVE
CULTURE: NO GROWTH
Lab: NORMAL
N. GONORRHOEAE DNA, URINE: NEGATIVE
SPECIMEN DESCRIPTION: NORMAL
SPECIMEN DESCRIPTION: NORMAL

## 2021-05-17 ENCOUNTER — OFFICE VISIT (OUTPATIENT)
Dept: FAMILY MEDICINE CLINIC | Age: 31
End: 2021-05-17

## 2021-05-17 VITALS
BODY MASS INDEX: 21.56 KG/M2 | WEIGHT: 154 LBS | DIASTOLIC BLOOD PRESSURE: 78 MMHG | SYSTOLIC BLOOD PRESSURE: 135 MMHG | HEIGHT: 71 IN

## 2021-05-17 DIAGNOSIS — R30.0 BURNING WITH URINATION: ICD-10-CM

## 2021-05-17 DIAGNOSIS — B35.6 TINEA CRURIS: Primary | ICD-10-CM

## 2021-05-17 PROCEDURE — 99213 OFFICE O/P EST LOW 20 MIN: CPT | Performed by: STUDENT IN AN ORGANIZED HEALTH CARE EDUCATION/TRAINING PROGRAM

## 2021-05-17 RX ORDER — CLOTRIMAZOLE 1 %
CREAM (GRAM) TOPICAL
Qty: 1 TUBE | Refills: 1 | Status: SHIPPED | OUTPATIENT
Start: 2021-05-17 | End: 2021-08-02 | Stop reason: ALTCHOICE

## 2021-05-17 ASSESSMENT — PATIENT HEALTH QUESTIONNAIRE - PHQ9
SUM OF ALL RESPONSES TO PHQ QUESTIONS 1-9: 0
SUM OF ALL RESPONSES TO PHQ QUESTIONS 1-9: 0
1. LITTLE INTEREST OR PLEASURE IN DOING THINGS: 0
SUM OF ALL RESPONSES TO PHQ QUESTIONS 1-9: 0

## 2021-05-17 NOTE — PROGRESS NOTES
Attending Physician Statement    Wt Readings from Last 3 Encounters:   05/17/21 154 lb (69.9 kg)   02/22/21 149 lb (67.6 kg)   09/21/20 141 lb (64 kg)     Temp Readings from Last 3 Encounters:   09/21/20 96.6 °F (35.9 °C) (Temporal)   02/21/19 98 °F (36.7 °C) (Temporal)   02/11/19 98.3 °F (36.8 °C) (Oral)     BP Readings from Last 3 Encounters:   05/17/21 135/78   02/22/21 (!) 143/89   09/21/20 126/78     Pulse Readings from Last 3 Encounters:   02/22/21 75   09/21/20 61   03/01/19 80         I have discussed the care of Phyliss Luis Alberto, including pertinent history and exam findings with the resident. I have reviewed the key elements of all parts of the encounter with the resident. I agree with the assessment, plan and orders as documented by the resident.   (GE Modifier)

## 2021-05-17 NOTE — PROGRESS NOTES
Visit Information    Have you changed or started any medications since your last visit including any over-the-counter medicines, vitamins, or herbal medicines? no   Are you having any side effects from any of your medications? -  no  Have you stopped taking any of your medications? Is so, why? -  no    Have you seen any other physician or provider since your last visit? No  Have you had any other diagnostic tests since your last visit? No  Have you been seen in the emergency room and/or had an admission to a hospital since we last saw you? No  Have you had your routine dental cleaning in the past 6 months? no    Have you activated your [employer] account? If not, what are your barriers?  No: declined     Patient Care Team:  Jemima Moncada MD as PCP - General (Family Medicine)    Medical History Review  Past Medical, Family, and Social History reviewed and does not contribute to the patient presenting condition    Health Maintenance   Topic Date Due    Hepatitis C screen  Never done    Varicella vaccine (1 of 2 - 2-dose childhood series) Never done    COVID-19 Vaccine (1) Never done    HIV screen  Never done    DTaP/Tdap/Td vaccine (1 - Tdap) Never done    Flu vaccine (Season Ended) 09/01/2021    Hepatitis A vaccine  Aged Out    Hepatitis B vaccine  Aged Out    Hib vaccine  Aged Out    Meningococcal (ACWY) vaccine  Aged Out    Pneumococcal 0-64 years Vaccine  Aged Out

## 2021-05-17 NOTE — PATIENT INSTRUCTIONS
Patient Education        Abhilash Yamel inguinal: Instrucciones de cuidado  Jock Itch: Care Instructions  Generalidades  La tiña inguinal es david infección de la fatuma causada por hongos. El hongo que causa la tiña inguinal vive en la piel. Afecta con frecuencia a los deportistas masculinos, ernie cualquiera puede tenerla. Es posible que le salga un salpullido que le produce comezón en la parte interna de los muslos y en las nalgas (glúteos). Se extiende y [place] a causar comezón cuando suda o se encuentra en duchas de vapor o vestidores. La tiña inguinal debería desaparecer pronto si mantiene la piel seca después de limpiarla. Usted puede tratar la tiña inguinal en el hogar con cremas antimicóticas que puede comprar sin receta. La atención de seguimiento es david parte clave de mata tratamiento y seguridad. Asegúrese de hacer y acudir a todas las citas, y llame a mata médico si está teniendo problemas. También es david buena idea saber los resultados de neftaly exámenes y mantener david lista de los medicamentos que poncho. ¿Cómo puede cuidarse en el hogar? · Lave el salpullido con agua y Lito. Seque la piel con toques suaves de toalla. · Póngase david compresa fría sobre la piel para aliviar la comezón. · Aplique crema antimicótica sobre el salpullido y por todo el borde del mismo. Siga las indicaciones del envase. · Para evitar la propagación, lávese cristy las mark después de tratar o tocar el salpullido. · Si mata médico le recetó un medicamento, tómelo exactamente según las indicaciones. Llame a mata médico si tiene algún problema con los medicamentos. · Evite rascarse el salpullido. · Báñese o dúchese a diario y después de hacer ejercicio. · Mantenga la piel lo más seca posible para permitir que sane. · Hasta que se cure la tiña inguinal, use ropa holgada de algodón. Evite la ropa interior, los pantalones y las mallas ajustados. · [employer] suspensorios y [address] cortos después de cada uso.   · No comparta ropa, equipos deportivos, toallas ni sábanas con otras personas para evitar el contagio de los hongos. Cómo prevenir la tiña inguinal  · Póngase los calcetines antes de ponerse la ropa interior si tiene pie de atleta. Esta acción ayuda a [employer] se propaguen de los pies a la fatuma. · [facility], la ropa interior, las medias y las toallas que [place] para hacer ejercicio físico después de cada uso. · Mantenga la fatuma, la parte interior de los muslos y las nalgas limpios y secos, especialmente después de hacer ejercicio y ducharse. · No preste ni tome prestados ropa, equipos deportivos, toallas o sábanas. · Utilice pantuflas o sandalias (chanclas) en vestuarios, duchas y og públicos. ¿Cuándo debes pedir ayuda? Llama a tu médico ahora mismo o busca atención médica inmediata si:    · [address], tales harmony:  ? Aumento del dolor, la hinchazón, la temperatura o el enrojecimiento. ? Vetas rojizas que salen del salpullido. ? Pus que sale del salpullido. ? Riley Grise. Presta especial atención a los cambios en tu jazmine y asegúrate de comunicarte con tu médico si:    · No mejoras harmony se esperaba. ¿Dónde puede encontrar más información en inglés? Jane Shipman a [email or web address] org e ingrese a mata cuenta de Arleen Rogel G303 en el cuadro \"Search Health Information\" para más información (en inglés) sobre \"Tiña inguinal: Instrucciones de cuidado. \"     Si no tiene david cuenta, josse agustin en el enlace \"Sign Up Now\". Revisado: 2 julio, 4120               WFJTSJS del contenido: 12.8  © 9024-4508 Healthwise, Incorporated. Las instrucciones de cuidado fueron adaptadas bajo licencia por [facility] ([facility]). Si usted tiene [place] afección médica o sobre estas instrucciones, siempre pregunte a mata profesional de jazmine. Healthwise, Incorporated niega toda garantía o responsabilidad por mata uso de esta información.          Patient Education        Dolor al esthela (disuria): para la higiene femenina que contengan desodorantes. ¿Cuándo debe pedir ayuda? Llame a mata médico ahora mismo o busque atención médica inmediata si:    · Tiene síntomas nuevos harmony fiebre, náuseas o vómito.     · Tiene síntomas nuevos o peores de un problema urinario. Por ejemplo:  ? Tiene bg o pus en la orina. ? Tiene escalofríos o le duele el cuerpo. ? Siente más dolor al [place]. ? Tiene dolor en la fatuma o el abdomen. ? Tiene dolor de espalda aleida debajo de la caja torácica (el flanco). Vigile muy de cerca los cambios en mata jazmine, y asegúrese de comunicarse con mata médico si tiene algún problema. ¿Dónde puede encontrar más información en inglés? Abigail Kimau a [email or web address]://chpepiceweb.health-[email or web address] e ingrese a mata cuenta de MyChart. Suze Keith N398 en el Laura Bruce \"Search Health Information\" para más información (en inglés) sobre \"Dolor al orinar (disuria): Instrucciones de cuidado. \"     Si no tiene david cuenta, josse clic en el enlace \"Sign Up Now\". Revisado: 29 junio, 2020               Versión del contenido: 12.8  © 7091-0990 Healthwise, Incorporated. Las instrucciones de cuidado fueron adaptadas bajo licencia por [facility] ([facility]). Si usted tiene [place] afección médica o sobre estas instrucciones, siempre pregunte a mata profesional de jazmine. Healthwise, Incorporated niega toda garantía o responsabilidad por mata uso de esta información.

## 2021-05-17 NOTE — PROGRESS NOTES
[facility] RESIDENCY PROGRAM    Subjective:    John Valente is a 31 y.o. male with  has no past medical history on file. No family history on file. Presented to the office today for:  Chief Complaint   Patient presents with    Follow-up     still having bumps, itchy and discomfort and but no pain/ cream helps but when finished they come back. HPI    INT #[id number]    C/o bumps on penis that he has had before  They respond to \"cream\"  Started again a few weeks ago  Is itching, no discharge  He works as a cook and / back of house  Is hot at work and he sweats a lot  Changes underwear daily     Review of Systems   Constitutional: Negative for activity change, appetite change, chills, diaphoresis, fatigue, fever and unexpected weight change. HENT: Negative for congestion, rhinorrhea, sneezing, sore throat, trouble swallowing and voice change. Eyes: Negative for pain and visual disturbance. Respiratory: Negative for cough. Negative for shortness of breath, wheezing and stridor. Cardiovascular: Negative for chest pain, palpitations and leg swelling. Gastrointestinal: Negative for abdominal pain, constipation, diarrhea, nausea and vomiting. Genitourinary: Negative for difficulty urinating and dysuria. Genital rash+  Musculoskeletal: Negative for arthralgias, gait problem, neck pain and neck stiffness. Neurological: Negative for dizziness, seizures, syncope, weakness and headaches. Objective:    /78 (Site: Right Upper Arm, Position: Sitting, Cuff Size: Medium Adult)   Ht 5' 11\" (1.803 m)   Wt 154 lb (69.9 kg)   BMI 21.48 kg/m²    BP Readings from Last 3 Encounters:   05/17/21 135/78   02/22/21 (!) 143/89   09/21/20 126/78     Physical Exam  Exam conducted with a chaperone present. Genitourinary:     Penis: Uncircumcised. No phimosis, hypospadias, erythema, tenderness, discharge, swelling or lesions.        Testes: Normal.      Comments: Groin clotrimazole (LOTRIMIN) 1 % cream REORDER       Return in about 3 months (around 8/17/2021), or if symptoms worsen or fail to improve.

## 2021-06-22 ENCOUNTER — TELEPHONE (OUTPATIENT)
Dept: UROLOGY | Age: 31
End: 2021-06-22

## 2021-08-02 ENCOUNTER — OFFICE VISIT (OUTPATIENT)
Dept: UROLOGY | Age: 31
End: 2021-08-02

## 2021-08-02 VITALS
BODY MASS INDEX: 21.62 KG/M2 | WEIGHT: 155 LBS | SYSTOLIC BLOOD PRESSURE: 145 MMHG | HEART RATE: 83 BPM | DIASTOLIC BLOOD PRESSURE: 91 MMHG

## 2021-08-02 DIAGNOSIS — N48.1 BALANITIS: ICD-10-CM

## 2021-08-02 DIAGNOSIS — N48.9 PENILE LESION: Primary | ICD-10-CM

## 2021-08-02 DIAGNOSIS — N47.8 REDUNDANT FORESKIN: ICD-10-CM

## 2021-08-02 PROCEDURE — 99204 OFFICE O/P NEW MOD 45 MIN: CPT | Performed by: UROLOGY

## 2021-08-02 NOTE — PROGRESS NOTES
Used interpretor harper # [id number]
been marked as taking for the 8/2/21 encounter (Office Visit) with Glenn Jacobsen MD.       Patient has no known allergies. Social History     Tobacco Use   Smoking Status Never Smoker   Smokeless Tobacco Never Used       Social History     Substance and Sexual Activity   Alcohol Use Yes       REVIEW OF SYSTEMS:  Constitutional: negative  Eyes: negative  Respiratory: negative  Cardiovascular: negative  Gastrointestinal: negative  Musculoskeletal: negative  Genitourinary: negative except for what is in HPI  Skin: negative   Neurological: negative  Hematological/Lymphatic: negative  Psychological: negative    Physical Exam:    This a 31 y.o. male   Vitals:    08/02/21 0828   BP: (!) 145/91   Pulse:      Constitutional: Patient in no acute distress; Neuro: alert and oriented to person place and time. Psych: Mood and affect normal.  Skin: Normal  Lungs: Respiratory effort normal  Cardiovascular:  Normal peripheral pulses  Abdomen: Soft, non-tender, non-distended with no CVA, flank pain, hepatosplenomegaly or hernia. Kidneys normal.  Bladder non-tender and not distended. Lymphatics: no palpable lymphadenopathy  Penis uncircumsized, has small erythematous lesions on dorsal and lateral sides of penis, no discharge. Mild balanitis  Urethral meatus normal  Scrotal exam normal  Testicles normal bilaterally  Epididymis normal bilaterally      Assessment and Plan      1. Penile lesion    2. Redundant foreskin    3. Balanitis           Plan:      No follow-ups on file. Patient needs a full skin back to then clean daily. Continue clotrimazole cream as needed. See back as needed. Discussed circumcision as a measure to prevent further episodes of balanitis. Patient like to hold off at this time.        Dr. Glenn Jacobsen MD

## 2025-05-30 ENCOUNTER — OFFICE VISIT (OUTPATIENT)
Age: 35
End: 2025-05-30

## 2025-05-30 VITALS
BODY MASS INDEX: 31.41 KG/M2 | TEMPERATURE: 98 F | SYSTOLIC BLOOD PRESSURE: 136 MMHG | DIASTOLIC BLOOD PRESSURE: 96 MMHG | HEART RATE: 81 BPM | WEIGHT: 160 LBS | OXYGEN SATURATION: 91 % | HEIGHT: 60 IN

## 2025-05-30 DIAGNOSIS — K29.60 REFLUX GASTRITIS: Primary | ICD-10-CM

## 2025-05-30 RX ORDER — OMEPRAZOLE 20 MG/1
20 CAPSULE, DELAYED RELEASE ORAL DAILY
Qty: 30 CAPSULE | Refills: 0 | Status: SHIPPED | OUTPATIENT
Start: 2025-05-30

## 2025-05-30 ASSESSMENT — ENCOUNTER SYMPTOMS
VOMITING: 0
NAUSEA: 0
RESPIRATORY NEGATIVE: 1
DIARRHEA: 0
ABDOMINAL PAIN: 1
BELCHING: 0

## 2025-05-30 NOTE — PROGRESS NOTES
[facility], [facility] ([place])  [facility] [place] [id number]  Dept: [phone]    Date: 25    Christopher Frank (:  1990) is a 34 y.o. male, here for evaluation of the following chief complaint(s):  Hiccups (Accompanied by sweating; 4 days) and Pharyngitis      HPI    History provided by:  Patient ( was used)  Abdominal Pain  This is a new problem. Episode onset: 4 days. The onset quality is sudden. The problem occurs 2 to 4 times per day. Duration: 15-20 minutes. The problem has been unchanged. Pertinent negatives include no belching, diarrhea, fever, headaches, nausea or vomiting.    Patient here with complaints of hiccup with sweating x 4 days. States the hiccups seem to occur after the patient eats something. The episode lasts about 15-20 mins and go away with home remedies. Last episode was last night.       History reviewed. No pertinent past medical history.      ROS  Review of Systems   Constitutional:  Negative for fatigue and fever.   HENT: Negative.     Respiratory: Negative.     Cardiovascular: Negative.    Gastrointestinal:  Positive for abdominal pain. Negative for diarrhea, nausea and vomiting.        Hiccups   Skin:  Negative for rash.   Neurological:  Negative for dizziness, light-headedness and headaches.       PHYSICAL EXAM  Vitals:    25 0957 25 1009   BP: (!) 131/92 (!) 136/96   Pulse: 81    Temp: 98 °F (36.7 °C)    SpO2: 91%    Weight: 72.6 kg (160 lb)    Height: 1.5 m (4' 11.06\")      Physical Exam  Constitutional:       General: He is not in acute distress.     Appearance: Normal appearance.   HENT:      Head: Normocephalic.      Nose: Nose normal.   Eyes:      Extraocular Movements: Extraocular movements intact.   Cardiovascular:      Rate and Rhythm: Normal rate.   Pulmonary:      Effort: Pulmonary effort is normal. No respiratory distress.   Abdominal:      Palpations: Abdomen is soft.   Skin:

## 2025-07-05 DIAGNOSIS — K29.60 REFLUX GASTRITIS: ICD-10-CM

## 2025-07-13 RX ORDER — OMEPRAZOLE 20 MG/1
20 CAPSULE, DELAYED RELEASE ORAL DAILY
Qty: 30 CAPSULE | Refills: 0 | OUTPATIENT
Start: 2025-07-13

## 2025-08-10 ENCOUNTER — OFFICE VISIT (OUTPATIENT)
Age: 35
End: 2025-08-10

## 2025-08-10 VITALS
DIASTOLIC BLOOD PRESSURE: 82 MMHG | WEIGHT: 160 LBS | TEMPERATURE: 97.6 F | RESPIRATION RATE: 16 BRPM | OXYGEN SATURATION: 98 % | BODY MASS INDEX: 31.41 KG/M2 | HEIGHT: 60 IN | SYSTOLIC BLOOD PRESSURE: 126 MMHG | HEART RATE: 59 BPM

## 2025-08-10 DIAGNOSIS — K58.9 IRRITABLE BOWEL SYNDROME, UNSPECIFIED TYPE: ICD-10-CM

## 2025-08-10 DIAGNOSIS — R14.0 ABDOMINAL BLOATING: ICD-10-CM

## 2025-08-10 DIAGNOSIS — K29.00 ACUTE ON CHRONIC GASTRITIS: ICD-10-CM

## 2025-08-10 DIAGNOSIS — K21.9 GASTROESOPHAGEAL REFLUX DISEASE WITHOUT ESOPHAGITIS: Primary | ICD-10-CM

## 2025-08-10 DIAGNOSIS — K29.50 ACUTE ON CHRONIC GASTRITIS: ICD-10-CM

## 2025-08-10 RX ORDER — SIMETHICONE 80 MG
80 TABLET,CHEWABLE ORAL 4 TIMES DAILY PRN
Qty: 180 TABLET | Refills: 3 | Status: SHIPPED | OUTPATIENT
Start: 2025-08-10

## 2025-08-10 RX ORDER — FAMOTIDINE 20 MG/1
20 TABLET, FILM COATED ORAL 2 TIMES DAILY
Qty: 60 TABLET | Refills: 0 | Status: SHIPPED | OUTPATIENT
Start: 2025-08-10

## 2025-08-10 RX ORDER — DICYCLOMINE HYDROCHLORIDE 10 MG/1
10 CAPSULE ORAL 4 TIMES DAILY PRN
Qty: 30 CAPSULE | Refills: 0 | Status: SHIPPED | OUTPATIENT
Start: 2025-08-10